# Patient Record
Sex: MALE | Race: WHITE
[De-identification: names, ages, dates, MRNs, and addresses within clinical notes are randomized per-mention and may not be internally consistent; named-entity substitution may affect disease eponyms.]

---

## 2017-08-25 ENCOUNTER — HOSPITAL ENCOUNTER (EMERGENCY)
Dept: HOSPITAL 53 - M ED | Age: 62
Discharge: HOME | End: 2017-08-25
Payer: COMMERCIAL

## 2017-08-25 VITALS — BODY MASS INDEX: 27.36 KG/M2 | HEIGHT: 65 IN | WEIGHT: 164.24 LBS

## 2017-08-25 VITALS — SYSTOLIC BLOOD PRESSURE: 135 MMHG | DIASTOLIC BLOOD PRESSURE: 85 MMHG

## 2017-08-25 DIAGNOSIS — M19.90: ICD-10-CM

## 2017-08-25 DIAGNOSIS — I99.8: ICD-10-CM

## 2017-08-25 DIAGNOSIS — I10: ICD-10-CM

## 2017-08-25 DIAGNOSIS — R00.1: ICD-10-CM

## 2017-08-25 DIAGNOSIS — Z79.899: ICD-10-CM

## 2017-08-25 DIAGNOSIS — Z87.891: ICD-10-CM

## 2017-08-25 DIAGNOSIS — R07.9: Primary | ICD-10-CM

## 2017-08-25 DIAGNOSIS — Z79.82: ICD-10-CM

## 2017-08-25 LAB
ALBUMIN SERPL BCG-MCNC: 3.7 GM/DL (ref 3.2–5.2)
ALBUMIN/GLOB SERPL: 0.97 {RATIO} (ref 1–1.93)
ALP SERPL-CCNC: 76 U/L (ref 45–117)
ALT SERPL W P-5'-P-CCNC: 41 U/L (ref 12–78)
ANION GAP SERPL CALC-SCNC: 9 MEQ/L (ref 8–16)
AST SERPL-CCNC: 31 U/L (ref 15–37)
BASOPHILS # BLD AUTO: 0.1 K/MM3 (ref 0–0.2)
BASOPHILS NFR BLD AUTO: 1.3 % (ref 0–1)
BILIRUB CONJ SERPL-MCNC: < 0.1 MG/DL (ref 0–0.2)
BILIRUB SERPL-MCNC: 0.3 MG/DL (ref 0.2–1)
BUN SERPL-MCNC: 15 MG/DL (ref 7–18)
CALCIUM SERPL-MCNC: 9.2 MG/DL (ref 8.8–10.2)
CHLORIDE SERPL-SCNC: 107 MEQ/L (ref 98–107)
CO2 SERPL-SCNC: 25 MEQ/L (ref 21–32)
CREAT SERPL-MCNC: 1 MG/DL (ref 0.7–1.3)
EOSINOPHIL # BLD AUTO: 0.5 K/MM3 (ref 0–0.5)
EOSINOPHIL NFR BLD AUTO: 5.5 % (ref 0–3)
ERYTHROCYTE [DISTWIDTH] IN BLOOD BY AUTOMATED COUNT: 13.3 % (ref 11.5–14.5)
GFR SERPL CREATININE-BSD FRML MDRD: > 60 ML/MIN/{1.73_M2} (ref 49–?)
GLUCOSE SERPL-MCNC: 114 MG/DL (ref 80–110)
LARGE UNSTAINED CELL #: 0.2 K/MM3 (ref 0–0.4)
LARGE UNSTAINED CELL %: 2 % (ref 0–4)
LYMPHOCYTES # BLD AUTO: 1.2 K/MM3 (ref 1.5–4.5)
LYMPHOCYTES NFR BLD AUTO: 11.5 % (ref 24–44)
MCH RBC QN AUTO: 31 PG (ref 27–33)
MCHC RBC AUTO-ENTMCNC: 36.3 G/DL (ref 32–36.5)
MCV RBC AUTO: 85.5 FL (ref 80–96)
MONOCYTES # BLD AUTO: 0.6 K/MM3 (ref 0–0.8)
MONOCYTES NFR BLD AUTO: 7.1 % (ref 0–5)
NEUTROPHILS # BLD AUTO: 6.3 K/MM3 (ref 1.8–7.7)
NEUTROPHILS NFR BLD AUTO: 72.5 % (ref 36–66)
PLATELET # BLD AUTO: 286 K/MM3 (ref 150–450)
POTASSIUM SERPL-SCNC: 3.9 MEQ/L (ref 3.5–5.1)
PROT SERPL-MCNC: 7.5 GM/DL (ref 6.4–8.2)
SODIUM SERPL-SCNC: 141 MEQ/L (ref 136–145)
WBC # BLD AUTO: 8.7 K/MM3 (ref 4–10)

## 2017-08-25 PROCEDURE — 93005 ELECTROCARDIOGRAM TRACING: CPT

## 2017-08-25 PROCEDURE — 99285 EMERGENCY DEPT VISIT HI MDM: CPT

## 2017-08-25 PROCEDURE — 71275 CT ANGIOGRAPHY CHEST: CPT

## 2017-08-25 PROCEDURE — 83880 ASSAY OF NATRIURETIC PEPTIDE: CPT

## 2017-08-25 PROCEDURE — 71010: CPT

## 2017-08-25 PROCEDURE — 85025 COMPLETE CBC W/AUTO DIFF WBC: CPT

## 2017-08-25 PROCEDURE — 82550 ASSAY OF CK (CPK): CPT

## 2017-08-25 PROCEDURE — 94760 N-INVAS EAR/PLS OXIMETRY 1: CPT

## 2017-08-25 PROCEDURE — 80048 BASIC METABOLIC PNL TOTAL CA: CPT

## 2017-08-25 PROCEDURE — 83690 ASSAY OF LIPASE: CPT

## 2017-08-25 PROCEDURE — 93041 RHYTHM ECG TRACING: CPT

## 2017-08-25 PROCEDURE — 80076 HEPATIC FUNCTION PANEL: CPT

## 2017-08-25 PROCEDURE — 82553 CREATINE MB FRACTION: CPT

## 2017-08-25 NOTE — REP
CT ANGIOGRAM OF THE CHEST :  With IV contrast.

 

HISTORY: Chest pain radiating to the back.  Question dissection.

 

COMPARISON STUDIES: No comparison chest CT.

 

CONTRAST DOSE:  75 mL  of Isovue 370 are administered intravenously.

 

CT TECHNIQUE:  Helical scanning is acquired and overlapping 1.5 mm and contiguous

3 mm axial images are reformatted.  In addition, a 3D work station is deployed to

generate thick slab maximum intensity projection images in sagittal and coronal

imaging projections.

 

CT PULMONARY ANGIOGRAPHIC FINDINGS: The thoracic aorta enhances homogeneously and

is normal in caliber, course and contour.  No evidence of dissection or aneurysm.

The left vertebral artery takes a direct aortic origin which is a normal variant.

No mediastinal mass or hematoma is seen.  The pulmonary arterial tree is enhanced

and there is no CT evidence of pulmonary embolus.  Maximal intensity projection

images show no vessel cutoff or filling defect.

 

There is no evidence of pleural or pericardial effusion.  No infiltrate is seen

in the lung fields.  There is a small pleural plaque anteriorly and several small

pleural plaques are seen in the major fissure on each side.  No pulmonary mass

lesion or infiltrate is seen.  There are however two or three subpleural

noncalcified pulmonary nodules in the right middle lobe.  The largest of these

measures 5 mm in greatest diameter.  There is a 4 mm subpleural nodule in the

left lower lobe adjacent to the major fissure.  No other significant pulmonary

nodule is appreciated.  No bony destructive lesion is appreciated.

 

IMPRESSION: Negative CT angiogram of the chest.  No evidence of aortic aneurysm

or dissection or pulmonary embolus.  Bilateral benign pleural plaquing. Several

subpleural subcentimeter noncalcified pulmonary nodules.  Follow-up chest CT

study recommended in 6-9 months.

 

 

Signed by

Candido Garcia MD 08/25/2017 11:52 A

## 2017-08-27 NOTE — ECGEPIP
Stationary ECG Study

                           [facility] - ED

                                       

                                       Test Date:    2017

Pat Name:     JENNIFER MILLER            Department:   

Patient ID:   [id number]                 Room:         -

Gender:       M                        Technician:   sb

:          1955               Requested By: SHAKILA LEUNG

Order Number: [id number]     Reading MD:   Moises Campos

                                 Measurements

Intervals                              Axis          

Rate:         67                       P:            56

ME:           168                      QRS:          -20

QRSD:         92                       T:            24

QT:           370                                    

QTc:          392                                    

                           Interpretive Statements

SINUS RHYTHM

 

Electronically Signed On 2017 8:34:59 EDT by Moises Campos

## 2017-08-27 NOTE — ECGEPIP
Stationary ECG Study

                           [facility] - ED

                                       

                                       Test Date:    2017

Pat Name:     JENNIFER MILLER            Department:   

Patient ID:   [id number]                 Room:         -

Gender:       M                        Technician:   JT

:          1955               Requested By: SHAKILA LEUNG

Order Number: [id number]     Reading MD:   Katlin Qiu

                                 Measurements

Intervals                              Axis          

Rate:         57                       P:            36

AR:           159                      QRS:          -16

QRSD:         98                       T:            15

QT:           398                                    

QTc:          391                                    

                           Interpretive Statements

SINUS BRADYCARDIA

ST ELEVATION, PROBABLY EARLY REPOLARIZATION, CLINIAL CORRELAATION TO EXCLUDE 

ISCHEMIA 

 

Electronically Signed On 2017 8:09:34 EDT by Katlin Qiu

## 2017-08-29 NOTE — REP
PORTABLE CHEST X-RAY:  Upright AP view.

 

HISTORY:  Chest pain.

 

COMPARISON STUDY:  November 10, 2016.

 

FINDINGS:  EKG monitoring electrodes overlie the chest.  Heart is not enlarged.

The lungs are well inflated and clear.  Pleural angles are sharp.  No significant

bony abnormality is seen.

 

IMPRESSION: No active disease.

 

 

Signed by

Candido Garcia MD 08/29/2017 12:14 P

## 2017-09-06 ENCOUNTER — HOSPITAL ENCOUNTER (OUTPATIENT)
Dept: HOSPITAL 53 - M LAB | Age: 62
End: 2017-09-06
Attending: FAMILY MEDICINE
Payer: COMMERCIAL

## 2017-09-06 DIAGNOSIS — R53.83: ICD-10-CM

## 2017-09-06 DIAGNOSIS — E03.9: Primary | ICD-10-CM

## 2017-09-06 DIAGNOSIS — D64.9: ICD-10-CM

## 2017-09-06 LAB
ALBUMIN SERPL BCG-MCNC: 3.5 GM/DL (ref 3.2–5.2)
ALBUMIN/GLOB SERPL: 1.06 {RATIO} (ref 1–1.93)
ALP SERPL-CCNC: 68 U/L (ref 45–117)
ALT SERPL W P-5'-P-CCNC: 74 U/L (ref 12–78)
ANION GAP SERPL CALC-SCNC: 11 MEQ/L (ref 8–16)
AST SERPL-CCNC: 35 U/L (ref 15–37)
BILIRUB SERPL-MCNC: 0.3 MG/DL (ref 0.2–1)
BUN SERPL-MCNC: 28 MG/DL (ref 7–18)
CALCIUM SERPL-MCNC: 8.8 MG/DL (ref 8.8–10.2)
CHLORIDE SERPL-SCNC: 103 MEQ/L (ref 98–107)
CHOLEST SERPL-MCNC: 216 MG/DL (ref ?–200)
CO2 SERPL-SCNC: 28 MEQ/L (ref 21–32)
CREAT SERPL-MCNC: 1.01 MG/DL (ref 0.7–1.3)
ERYTHROCYTE [DISTWIDTH] IN BLOOD BY AUTOMATED COUNT: 13.2 % (ref 11.5–14.5)
EST. AVERAGE GLUCOSE BLD GHB EST-MCNC: 126 MG/DL (ref 60–110)
GFR SERPL CREATININE-BSD FRML MDRD: > 60 ML/MIN/{1.73_M2} (ref 49–?)
GLUCOSE SERPL-MCNC: 83 MG/DL (ref 80–110)
MCH RBC QN AUTO: 29.9 PG (ref 27–33)
MCHC RBC AUTO-ENTMCNC: 33.9 G/DL (ref 32–36.5)
MCV RBC AUTO: 88.1 FL (ref 80–96)
PLATELET # BLD AUTO: 311 K/MM3 (ref 150–450)
POTASSIUM SERPL-SCNC: 3.9 MEQ/L (ref 3.5–5.1)
PROT SERPL-MCNC: 6.8 GM/DL (ref 6.4–8.2)
SODIUM SERPL-SCNC: 142 MEQ/L (ref 136–145)
TRIGL SERPL-MCNC: 137 MG/DL (ref ?–150)
WBC # BLD AUTO: 11.8 K/MM3 (ref 4–10)

## 2018-04-16 ENCOUNTER — HOSPITAL ENCOUNTER (EMERGENCY)
Dept: HOSPITAL 53 - M ED | Age: 63
Discharge: HOME | End: 2018-04-16
Payer: COMMERCIAL

## 2018-04-16 DIAGNOSIS — M19.90: ICD-10-CM

## 2018-04-16 DIAGNOSIS — Z79.82: ICD-10-CM

## 2018-04-16 DIAGNOSIS — M54.5: ICD-10-CM

## 2018-04-16 DIAGNOSIS — Z87.891: ICD-10-CM

## 2018-04-16 DIAGNOSIS — I10: ICD-10-CM

## 2018-04-16 DIAGNOSIS — Z79.899: ICD-10-CM

## 2018-04-16 DIAGNOSIS — R07.9: Primary | ICD-10-CM

## 2018-04-16 LAB
ALBUMIN/GLOBULIN RATIO: 1 (ref 1–1.93)
ALBUMIN: 3.7 GM/DL (ref 3.2–5.2)
ALKALINE PHOSPHATASE: 85 U/L (ref 45–117)
ALT SERPL W P-5'-P-CCNC: 29 U/L (ref 12–78)
ANION GAP: 5 MEQ/L (ref 8–16)
AST SERPL-CCNC: 28 U/L (ref 7–37)
BASO #: 0.1 10^3/UL (ref 0–0.2)
BASO %: 1.2 % (ref 0–1)
BILIRUB CONJ SERPL-MCNC: < 0.1 MG/DL (ref 0–0.2)
BILIRUBIN,TOTAL: 0.3 MG/DL (ref 0.2–1)
BLOOD UREA NITROGEN: 26 MG/DL (ref 7–18)
CALCIUM LEVEL: 8.6 MG/DL (ref 8.8–10.2)
CARBON DIOXIDE LEVEL: 27 MEQ/L (ref 21–32)
CHLORIDE LEVEL: 109 MEQ/L (ref 98–107)
CK MB CFR.DF SERPL CALC: 1.03
CK MB CFR.DF SERPL CALC: 1.32
CK SERPL-CCNC: 204 U/L (ref 39–308)
CK SERPL-CCNC: 223 U/L (ref 39–308)
CK-MB VALUE MASS: 2.3 NG/ML (ref ?–3.6)
CK-MB VALUE MASS: 2.7 NG/ML (ref ?–3.6)
CREATININE FOR GFR: 1.08 MG/DL (ref 0.7–1.3)
EOS #: 0.4 10^3/UL (ref 0–0.5)
EOSINOPHIL NFR BLD AUTO: 5.8 % (ref 0–3)
GFR SERPL CREATININE-BSD FRML MDRD: > 60 ML/MIN/{1.73_M2} (ref 49–?)
GLUCOSE, FASTING: 108 MG/DL (ref 70–100)
HEMATOCRIT: 38.9 % (ref 42–52)
HEMOGLOBIN: 13.6 G/DL (ref 13.5–17.5)
IMMATURE GRANULOCYTE %: 0.6 % (ref 0–3)
INR: 0.94
LIPASE: 254 U/L (ref 73–393)
LYMPH #: 1.6 10^3/UL (ref 1.5–4.5)
LYMPH %: 23.7 % (ref 24–44)
MEAN CORPUSCULAR HEMOGLOBIN: 29.5 PG (ref 27–33)
MEAN CORPUSCULAR HGB CONC: 35 G/DL (ref 32–36.5)
MEAN CORPUSCULAR VOLUME: 84.4 FL (ref 80–96)
MONO #: 0.6 10^3/UL (ref 0–0.8)
MONO %: 9.5 % (ref 0–5)
NEUTROPHILS #: 3.9 10^3/UL (ref 1.8–7.7)
NEUTROPHILS %: 59.2 % (ref 36–66)
NRBC BLD AUTO-RTO: 0 % (ref 0–0)
NT-PRO BNP: 16 PG/ML (ref ?–125)
PLATELET COUNT, AUTOMATED: 308 10^3/UL (ref 150–450)
POTASSIUM SERUM: 3.6 MEQ/L (ref 3.5–5.1)
PROTHROMBIN TIME: 12.7 SECONDS (ref 12.4–14.5)
RED BLOOD COUNT: 4.61 10^6/UL (ref 4.3–6.1)
RED CELL DISTRIBUTION WIDTH: 13 % (ref 11.5–14.5)
SODIUM LEVEL: 141 MEQ/L (ref 136–145)
THYROID STIMULATING HORMONE: 4.09 UIU/ML (ref 0.36–3.74)
TOTAL PROTEIN: 7.4 GM/DL (ref 6.4–8.2)
TROPONIN I: < 0.02 NG/ML (ref ?–0.1)
TROPONIN I: < 0.02 NG/ML (ref ?–0.1)
WHITE BLOOD COUNT: 6.6 10^3/UL (ref 4–10)

## 2018-04-16 RX ADMIN — ASPIRIN 81 MG CHEWABLE TABLET 1 MG: 81 TABLET CHEWABLE at 20:14

## 2018-10-30 ENCOUNTER — HOSPITAL ENCOUNTER (OUTPATIENT)
Dept: HOSPITAL 53 - M CARPUL | Age: 63
End: 2018-10-30
Attending: SPECIALIST
Payer: MEDICAID

## 2018-10-30 DIAGNOSIS — R06.2: Primary | ICD-10-CM

## 2018-10-30 PROCEDURE — 94060 EVALUATION OF WHEEZING: CPT

## 2019-06-26 ENCOUNTER — HOSPITAL ENCOUNTER (OUTPATIENT)
Dept: HOSPITAL 53 - M RAD | Age: 64
End: 2019-06-26
Attending: SPECIALIST
Payer: COMMERCIAL

## 2019-06-26 DIAGNOSIS — R91.8: Primary | ICD-10-CM

## 2019-06-26 DIAGNOSIS — J45.20: ICD-10-CM

## 2019-06-26 NOTE — REP
Clinical:  Abnormal pulmonary findings.

 

Technique:  Axial noncontrast images from the thoracic inlet to the upper abdomen

with coronal and sagittal re-formations.

 

Comparison:  04/16/2018, 08/25/2017.

 

Findings:

The bilateral lung fields are well-aerated and relatively symmetric without acute

consolidation or effusion.  No pneumothorax.  Few scattered predominately

subpleural noncalcified nodules are again identified and remains stable through

08/25/2017.  No new acute or significant nodule or mass lesion appreciated.  No

adenopathy is appreciated.  Mediastinum demonstrates normal thoracic aorta,

pulmonary vasculature and heart/pericardium.  Limited upper abdomen demonstrates

normal bilateral adrenal glands.  Musculoskeletal structures are intact.

 

Impression:

1.  No acute mediastinal or pleuroparenchymal process.

2.  Few scattered bilateral primarily subpleural noncalcified nodules remains

stable through 08/25/2017.

 

 

Electronically Signed by

Ryan Monet MD 06/26/2019 10:12 A

## 2020-05-01 ENCOUNTER — HOSPITAL ENCOUNTER (OUTPATIENT)
Dept: HOSPITAL 53 - M LAB | Age: 65
End: 2020-05-01
Attending: FAMILY MEDICINE
Payer: COMMERCIAL

## 2020-05-01 DIAGNOSIS — R53.83: ICD-10-CM

## 2020-05-01 DIAGNOSIS — I10: Primary | ICD-10-CM

## 2020-05-01 DIAGNOSIS — E03.9: ICD-10-CM

## 2020-05-01 LAB
ALBUMIN SERPL BCG-MCNC: 3.9 GM/DL (ref 3.2–5.2)
ALT SERPL W P-5'-P-CCNC: 42 U/L (ref 12–78)
BILIRUB SERPL-MCNC: 0.4 MG/DL (ref 0.2–1)
BUN SERPL-MCNC: 26 MG/DL (ref 7–18)
CALCIUM SERPL-MCNC: 9.4 MG/DL (ref 8.8–10.2)
CHLORIDE SERPL-SCNC: 105 MEQ/L (ref 98–107)
CHOLEST SERPL-MCNC: 252 MG/DL (ref ?–200)
CHOLEST/HDLC SERPL: 4 {RATIO} (ref ?–5)
CO2 SERPL-SCNC: 31 MEQ/L (ref 21–32)
CREAT SERPL-MCNC: 1.22 MG/DL (ref 0.7–1.3)
EST. AVERAGE GLUCOSE BLD GHB EST-MCNC: 120 MG/DL (ref 60–110)
GFR SERPL CREATININE-BSD FRML MDRD: > 60 ML/MIN/{1.73_M2} (ref 49–?)
GLUCOSE SERPL-MCNC: 109 MG/DL (ref 70–100)
HCT VFR BLD AUTO: 48.4 % (ref 42–52)
HDLC SERPL-MCNC: 63 MG/DL (ref 40–?)
HGB BLD-MCNC: 16.1 G/DL (ref 13.5–17.5)
LDLC SERPL CALC-MCNC: 165 MG/DL (ref ?–100)
MCH RBC QN AUTO: 29.8 PG (ref 27–33)
MCHC RBC AUTO-ENTMCNC: 33.3 G/DL (ref 32–36.5)
MCV RBC AUTO: 89.6 FL (ref 80–96)
NONHDLC SERPL-MCNC: 189 MG/DL
PLATELET # BLD AUTO: 329 10^3/UL (ref 150–450)
POTASSIUM SERPL-SCNC: 4.5 MEQ/L (ref 3.5–5.1)
PROT SERPL-MCNC: 7.6 GM/DL (ref 6.4–8.2)
RBC # BLD AUTO: 5.4 10^6/UL (ref 4.3–6.1)
SODIUM SERPL-SCNC: 142 MEQ/L (ref 136–145)
TESTOST SERPL-MCNC: 644 NG/DL (ref 241–827)
TRIGL SERPL-MCNC: 118 MG/DL (ref ?–150)
TSH SERPL DL<=0.005 MIU/L-ACNC: 2.5 UIU/ML (ref 0.36–3.74)
WBC # BLD AUTO: 7 10^3/UL (ref 4–10)

## 2020-05-01 NOTE — REP
At x-ray:  Two views.

 

History:  Hypertension.

 

Comparison chest x-ray:  April 16, 2018.

 

Findings:  The lungs are symmetrically aerated and clear.  Pleural angles are

sharp.  Heart size is normal.  Pulmonary vasculature is not increased.  No bony

abnormalities seen.

 

Impression:

 

No acute disease.

 

 

Electronically Signed by

Candido Garcia MD 05/01/2020 09:09 A

## 2020-05-01 NOTE — ECGEPIP
[facility]

                                       

                                       Test Date:    2020

Pat Name:     JENNIFER MILLER            Department:   

Patient ID:   [id number]                 Room:         -

Gender:       Male                     Technician:   ANTHONY

:          1955               Requested By: Morales Dunn

Order Number: [id number]     Reading MD:   Javier Baez

                                 Measurements

Intervals                              Axis          

Rate:         64                       P:            56

UT:           169                      QRS:          -23

QRSD:         97                       T:            52

QT:           384                                    

QTc:          398                                    

                           Interpretive Statements

Normal sinus rhythm

Leftward axis

Incomplete right bundle branch block

No significant change when compared to prior tracing of 2018

Electronically Signed on 2020 16:00:43 EDT by Javier Baez

## 2020-06-05 ENCOUNTER — HOSPITAL ENCOUNTER (OUTPATIENT)
Dept: HOSPITAL 53 - M LAB | Age: 65
End: 2020-06-05
Attending: FAMILY MEDICINE
Payer: COMMERCIAL

## 2020-06-05 DIAGNOSIS — I10: Primary | ICD-10-CM

## 2020-06-05 DIAGNOSIS — R53.83: ICD-10-CM

## 2020-06-05 DIAGNOSIS — E03.9: ICD-10-CM

## 2020-06-05 LAB
ALBUMIN SERPL BCG-MCNC: 3.7 GM/DL (ref 3.2–5.2)
ALT SERPL W P-5'-P-CCNC: 39 U/L (ref 12–78)
BILIRUB SERPL-MCNC: 0.4 MG/DL (ref 0.2–1)
BUN SERPL-MCNC: 21 MG/DL (ref 7–18)
CALCIUM SERPL-MCNC: 9.3 MG/DL (ref 8.8–10.2)
CHLORIDE SERPL-SCNC: 106 MEQ/L (ref 98–107)
CHOLEST SERPL-MCNC: 208 MG/DL (ref ?–200)
CHOLEST/HDLC SERPL: 3.41 {RATIO} (ref ?–5)
CO2 SERPL-SCNC: 28 MEQ/L (ref 21–32)
CREAT SERPL-MCNC: 1.08 MG/DL (ref 0.7–1.3)
EST. AVERAGE GLUCOSE BLD GHB EST-MCNC: 120 MG/DL (ref 60–110)
GFR SERPL CREATININE-BSD FRML MDRD: > 60 ML/MIN/{1.73_M2} (ref 49–?)
GLUCOSE SERPL-MCNC: 105 MG/DL (ref 70–100)
HCT VFR BLD AUTO: 44.1 % (ref 42–52)
HDLC SERPL-MCNC: 61 MG/DL (ref 40–?)
HGB BLD-MCNC: 15.2 G/DL (ref 13.5–17.5)
LDLC SERPL CALC-MCNC: 129 MG/DL (ref ?–100)
MCH RBC QN AUTO: 30.5 PG (ref 27–33)
MCHC RBC AUTO-ENTMCNC: 34.5 G/DL (ref 32–36.5)
MCV RBC AUTO: 88.4 FL (ref 80–96)
NONHDLC SERPL-MCNC: 147 MG/DL
PLATELET # BLD AUTO: 312 10^3/UL (ref 150–450)
POTASSIUM SERPL-SCNC: 4.1 MEQ/L (ref 3.5–5.1)
PROT SERPL-MCNC: 7.2 GM/DL (ref 6.4–8.2)
PSA SERPL-MCNC: 0.54 NG/ML (ref ?–4)
RBC # BLD AUTO: 4.99 10^6/UL (ref 4.3–6.1)
SODIUM SERPL-SCNC: 141 MEQ/L (ref 136–145)
TESTOST SERPL-MCNC: 599 NG/DL (ref 241–827)
TRIGL SERPL-MCNC: 89 MG/DL (ref ?–150)
TSH SERPL DL<=0.005 MIU/L-ACNC: 2.57 UIU/ML (ref 0.36–3.74)
WBC # BLD AUTO: 5.8 10^3/UL (ref 4–10)

## 2020-08-12 ENCOUNTER — HOSPITAL ENCOUNTER (OUTPATIENT)
Dept: HOSPITAL 53 - M LAB | Age: 65
End: 2020-08-12
Attending: FAMILY MEDICINE
Payer: COMMERCIAL

## 2020-08-12 DIAGNOSIS — E11.9: Primary | ICD-10-CM

## 2020-08-12 DIAGNOSIS — R53.83: ICD-10-CM

## 2020-09-14 LAB
HCT VFR BLD AUTO: 43.7 % (ref 42–52)
HGB BLD-MCNC: 15.1 G/DL (ref 13.5–17.5)
MCH RBC QN AUTO: 31.1 PG (ref 27–33)
MCHC RBC AUTO-ENTMCNC: 34.6 G/DL (ref 32–36.5)
MCV RBC AUTO: 90.1 FL (ref 80–96)
PLATELET # BLD AUTO: 303 10^3/UL (ref 150–450)
RBC # BLD AUTO: 4.85 10^6/UL (ref 4.3–6.1)
WBC # BLD AUTO: 7.3 10^3/UL (ref 4–10)

## 2020-09-27 LAB
ALBUMIN SERPL BCG-MCNC: 4 GM/DL (ref 3.2–5.2)
ALT SERPL W P-5'-P-CCNC: 30 U/L (ref 12–78)
BILIRUB SERPL-MCNC: 0.6 MG/DL (ref 0.2–1)
BUN SERPL-MCNC: 23 MG/DL (ref 7–18)
CALCIUM SERPL-MCNC: 8.9 MG/DL (ref 8.8–10.2)
CHLORIDE SERPL-SCNC: 106 MEQ/L (ref 98–107)
CHOLEST SERPL-MCNC: 219 MG/DL (ref ?–200)
CHOLEST/HDLC SERPL: 3.48 {RATIO} (ref ?–5)
CO2 SERPL-SCNC: 30 MEQ/L (ref 21–32)
CREAT SERPL-MCNC: 1.06 MG/DL (ref 0.7–1.3)
EST. AVERAGE GLUCOSE BLD GHB EST-MCNC: 111 MG/DL (ref 60–110)
GFR SERPL CREATININE-BSD FRML MDRD: > 60 ML/MIN/{1.73_M2} (ref 49–?)
GLUCOSE SERPL-MCNC: 106 MG/DL (ref 70–100)
HDLC SERPL-MCNC: 63 MG/DL (ref 40–?)
LDLC SERPL CALC-MCNC: 140 MG/DL (ref ?–100)
NONHDLC SERPL-MCNC: 156 MG/DL
POTASSIUM SERPL-SCNC: 4.2 MEQ/L (ref 3.5–5.1)
PROT SERPL-MCNC: 7.5 GM/DL (ref 6.4–8.2)
PSA SERPL-MCNC: 0.7 NG/ML (ref ?–4)
SODIUM SERPL-SCNC: 140 MEQ/L (ref 136–145)
TESTOST SERPL-MCNC: 577 NG/DL (ref 241–827)
TRIGL SERPL-MCNC: 82 MG/DL (ref ?–150)
TSH SERPL DL<=0.005 MIU/L-ACNC: 2.09 UIU/ML (ref 0.36–3.74)

## 2020-10-15 ENCOUNTER — HOSPITAL ENCOUNTER (OUTPATIENT)
Dept: HOSPITAL 53 - M RAD | Age: 65
End: 2020-10-15
Attending: FAMILY MEDICINE
Payer: COMMERCIAL

## 2020-10-15 DIAGNOSIS — M54.30: Primary | ICD-10-CM

## 2020-10-15 NOTE — REP
INDICATION:

SCIATICA



COMPARISON:

None.



TECHNIQUE:

AP, lateral, bilateral oblique, and coned-down views.



FINDINGS:

Alignment and lordosis is maintained.  The vertebral bodies including transverse

process and spinous processes are intact and normal.  There is no evidence for acute

fracture / compression injury or subluxation.  No evidence for spondylolysis or

spondylolisthesis.  Mild generalized age-related changes are suggested.



IMPRESSION:

Essentially normal age-appropriate lumbosacral spine radiograph series.  If the

patient remains symptomatic consider MRI for further investigation.



<Electronically signed by Ryan Monet > 10/15/20 [id number]

## 2021-05-12 ENCOUNTER — HOSPITAL ENCOUNTER (OUTPATIENT)
Dept: HOSPITAL 53 - M LAB | Age: 66
End: 2021-05-12
Attending: FAMILY MEDICINE
Payer: COMMERCIAL

## 2021-05-12 DIAGNOSIS — R53.83: ICD-10-CM

## 2021-05-12 DIAGNOSIS — D64.9: Primary | ICD-10-CM

## 2021-05-12 DIAGNOSIS — I10: ICD-10-CM

## 2021-05-12 DIAGNOSIS — E03.9: ICD-10-CM

## 2021-05-12 LAB
ALBUMIN SERPL BCG-MCNC: 3.8 GM/DL (ref 3.2–5.2)
ALT SERPL W P-5'-P-CCNC: 46 U/L (ref 12–78)
BILIRUB SERPL-MCNC: 0.6 MG/DL (ref 0.2–1)
BUN SERPL-MCNC: 19 MG/DL (ref 7–18)
CALCIUM SERPL-MCNC: 9.2 MG/DL (ref 8.8–10.2)
CHLORIDE SERPL-SCNC: 105 MEQ/L (ref 98–107)
CHOLEST SERPL-MCNC: 255 MG/DL (ref ?–200)
CHOLEST/HDLC SERPL: 3.49 {RATIO} (ref ?–5)
CO2 SERPL-SCNC: 29 MEQ/L (ref 21–32)
CREAT SERPL-MCNC: 0.99 MG/DL (ref 0.7–1.3)
EST. AVERAGE GLUCOSE BLD GHB EST-MCNC: 103 MG/DL (ref 60–110)
GFR SERPL CREATININE-BSD FRML MDRD: > 60 ML/MIN/{1.73_M2} (ref 49–?)
GLUCOSE SERPL-MCNC: 110 MG/DL (ref 70–100)
HCT VFR BLD AUTO: 45.1 % (ref 42–52)
HDLC SERPL-MCNC: 73 MG/DL (ref 40–?)
HGB BLD-MCNC: 15.7 G/DL (ref 13.5–17.5)
LDLC SERPL CALC-MCNC: 158 MG/DL (ref ?–100)
MCH RBC QN AUTO: 30.9 PG (ref 27–33)
MCHC RBC AUTO-ENTMCNC: 34.8 G/DL (ref 32–36.5)
MCV RBC AUTO: 88.8 FL (ref 80–96)
NONHDLC SERPL-MCNC: 182 MG/DL
PLATELET # BLD AUTO: 280 10^3/UL (ref 150–450)
POTASSIUM SERPL-SCNC: 4.3 MEQ/L (ref 3.5–5.1)
PROT SERPL-MCNC: 7.2 GM/DL (ref 6.4–8.2)
PSA SERPL-MCNC: 0.6 NG/ML (ref ?–4)
RBC # BLD AUTO: 5.08 10^6/UL (ref 4.3–6.1)
SODIUM SERPL-SCNC: 139 MEQ/L (ref 136–145)
TESTOST SERPL-MCNC: 656 NG/DL (ref 241–827)
TRIGL SERPL-MCNC: 118 MG/DL (ref ?–150)
TSH SERPL DL<=0.005 MIU/L-ACNC: 3.07 UIU/ML (ref 0.36–3.74)
WBC # BLD AUTO: 5.7 10^3/UL (ref 4–10)

## 2022-05-12 ENCOUNTER — HOSPITAL ENCOUNTER (OUTPATIENT)
Dept: HOSPITAL 53 - M LAB | Age: 67
End: 2022-05-12
Attending: PHYSICIAN ASSISTANT
Payer: COMMERCIAL

## 2022-05-12 DIAGNOSIS — R07.89: ICD-10-CM

## 2022-05-12 DIAGNOSIS — I10: ICD-10-CM

## 2022-05-12 DIAGNOSIS — E78.5: Primary | ICD-10-CM

## 2022-05-12 LAB
ALBUMIN SERPL BCG-MCNC: 4 GM/DL (ref 3.2–5.2)
ALT SERPL W P-5'-P-CCNC: 39 U/L (ref 12–78)
BILIRUB SERPL-MCNC: 0.3 MG/DL (ref 0.2–1)
BUN SERPL-MCNC: 26 MG/DL (ref 7–18)
CALCIUM SERPL-MCNC: 9.1 MG/DL (ref 8.8–10.2)
CHLORIDE SERPL-SCNC: 110 MEQ/L (ref 98–107)
CHOLEST SERPL-MCNC: 245 MG/DL (ref ?–200)
CHOLEST/HDLC SERPL: 4.62 {RATIO} (ref ?–5)
CO2 SERPL-SCNC: 28 MEQ/L (ref 21–32)
CREAT SERPL-MCNC: 1.12 MG/DL (ref 0.7–1.3)
GFR SERPL CREATININE-BSD FRML MDRD: > 60 ML/MIN/{1.73_M2} (ref 49–?)
GLUCOSE SERPL-MCNC: 126 MG/DL (ref 70–100)
HCT VFR BLD AUTO: 43.4 % (ref 42–52)
HDLC SERPL-MCNC: 53 MG/DL (ref 40–?)
HGB BLD-MCNC: 15.5 G/DL (ref 13.5–17.5)
LDLC SERPL CALC-MCNC: 167 MG/DL (ref ?–100)
MCH RBC QN AUTO: 32 PG (ref 27–33)
MCHC RBC AUTO-ENTMCNC: 35.7 G/DL (ref 32–36.5)
MCV RBC AUTO: 89.5 FL (ref 80–96)
NONHDLC SERPL-MCNC: 192 MG/DL
PLATELET # BLD AUTO: 264 10^3/UL (ref 150–450)
POTASSIUM SERPL-SCNC: 4.3 MEQ/L (ref 3.5–5.1)
PROT SERPL-MCNC: 6.8 GM/DL (ref 6.4–8.2)
RBC # BLD AUTO: 4.85 10^6/UL (ref 4.3–6.1)
SODIUM SERPL-SCNC: 140 MEQ/L (ref 136–145)
TRIGL SERPL-MCNC: 123 MG/DL (ref ?–150)
WBC # BLD AUTO: 6 10^3/UL (ref 4–10)

## 2023-05-16 ENCOUNTER — HOSPITAL ENCOUNTER (OUTPATIENT)
Dept: HOSPITAL 53 - M LAB | Age: 68
End: 2023-05-16
Attending: STUDENT IN AN ORGANIZED HEALTH CARE EDUCATION/TRAINING PROGRAM
Payer: MEDICARE

## 2023-05-16 DIAGNOSIS — J45.20: Primary | ICD-10-CM

## 2023-05-16 LAB
BASOPHILS # BLD AUTO: 0.1 10^3/UL (ref 0–0.2)
BASOPHILS NFR BLD AUTO: 1.2 % (ref 0–1)
EOSINOPHIL # BLD AUTO: 0.3 10^3/UL (ref 0–0.5)
EOSINOPHIL NFR BLD AUTO: 3.9 % (ref 0–3)
HCT VFR BLD AUTO: 47.1 % (ref 42–52)
HGB BLD-MCNC: 15.8 G/DL (ref 13.5–17.5)
LYMPHOCYTES # BLD AUTO: 1.3 10^3/UL (ref 1.5–5)
LYMPHOCYTES NFR BLD AUTO: 17.5 % (ref 24–44)
MCH RBC QN AUTO: 30.8 PG (ref 27–33)
MCHC RBC AUTO-ENTMCNC: 33.5 G/DL (ref 32–36.5)
MCV RBC AUTO: 91.8 FL (ref 80–96)
MONOCYTES # BLD AUTO: 0.6 10^3/UL (ref 0–0.8)
MONOCYTES NFR BLD AUTO: 7.9 % (ref 2–8)
NEUTROPHILS # BLD AUTO: 5.1 10^3/UL (ref 1.5–8.5)
NEUTROPHILS NFR BLD AUTO: 69 % (ref 36–66)
PLATELET # BLD AUTO: 261 10^3/UL (ref 150–450)
RBC # BLD AUTO: 5.13 10^6/UL (ref 4.3–6.1)
WBC # BLD AUTO: 7.4 10^3/UL (ref 4–10)

## 2023-05-24 LAB
A FUMIGATUS IGE QN: < 0.1 KU/L
C HERBARUM IGE QN: < 0.1 KU/L
CAT DANDER IGE QN: 0.36 KU/L
COMMON RAGWEED IGE QN: 11.1 KU/L
MT JUNIPER IGE QN: < 0.1 KU/L
RED OAK IGE QN: 0.35 KU/L
WHITE ASH IGE QN: 0.16 KU/L
WHITE ELM IGE QN: 0.18 KU/L